# Patient Record
Sex: FEMALE | Race: WHITE | URBAN - METROPOLITAN AREA
[De-identification: names, ages, dates, MRNs, and addresses within clinical notes are randomized per-mention and may not be internally consistent; named-entity substitution may affect disease eponyms.]

---

## 2020-12-30 ENCOUNTER — OFFICE VISIT (OUTPATIENT)
Dept: ORTHOPEDIC SURGERY | Age: 50
End: 2020-12-30
Payer: COMMERCIAL

## 2020-12-30 VITALS — HEIGHT: 62 IN | BODY MASS INDEX: 26.68 KG/M2 | WEIGHT: 145 LBS

## 2020-12-30 PROBLEM — S92.491A OTHER FRACTURE OF RIGHT GREAT TOE, INITIAL ENCOUNTER FOR CLOSED FRACTURE: Status: ACTIVE | Noted: 2020-12-30

## 2020-12-30 PROCEDURE — 99203 OFFICE O/P NEW LOW 30 MIN: CPT | Performed by: ORTHOPAEDIC SURGERY

## 2020-12-30 PROCEDURE — 28490 TREAT BIG TOE FRACTURE: CPT | Performed by: ORTHOPAEDIC SURGERY

## 2020-12-30 NOTE — LETTER
[address] [place] [address]  Carlos Moser [address] [place] [address]  Phone: [phone]  Fax: [phone]    Rylie tOto MD        December 30, 2020     Patient: Teresa Soliz   YOB: 1970   Date of Visit: 12/30/2020       To Whom It May Concern: It is my medical opinion that Teresa Soliz may return to work on 12/30/20 with the following restrictions: avoid excessive walking or standing with ability to wear Post-Op Shoe for 2wks and a increase frequency of rest breaks. If you have any questions or concerns, please don't hesitate to call.     Sincerely,          Rylie Otto MD

## 2020-12-30 NOTE — PROGRESS NOTES
Chief Complaint    New Patient (Bilateral Foot P+ resulting from fall at work resulting in hyperflexion at mid foot. C/o P+, tenderness, instability over dorsal midfoot)      History of Present Illness:  Merrill Shoemaker is a 45 y.o. female for evaluation of chief complaint bilateral foot pain after injury to both feet at work. This started approximately December 1 when the injury happened. Symptoms are worse with activity and better with rest. Patient endorses bilateral foot pain but denies numbness or tingling. Treatment to date includes: Splint immobilization of right foot as well as postop shoe, protected weightbearing. She was told she had fractures across her forefoot on the right side. She is having a little bit of lateral foot pain in the left side that she thinks may just be from compensating. Medical History:  Patient's medications, allergies, past medical, surgical, social and family histories were reviewed and updated as appropriate. Review of Systems:  Complete review of systems reviewed and available in the patient's chart. They are negative or noncontributory except as per HPI. Vital Signs: There were no vitals filed for this visit. General: well-developed, well-nourished; adequately groomed  CV: RR  RESP: Respirations unlabored on RA  Skin: No rashes or ulcerations appreciated  Psych: appropriate mood and affect      Musculoskeletal:    Extremity: Bilateral lowers    Inspection: Right lower extremity there are some swelling mostly centered around the right great toe, left foot is normal    Palpation: On the right side tenderness palpation about the IP joint of the right great toe, mild tenderness to palpation to the midfoot and distal foot for lesser toes. Mild tenderness palpation over the base of the fifth on the left side.     Range of Motion: Full ankle range of motion    Stability: Ankles are stable Strength: 5 out of 5 plantar flexion dorsiflexion inversion eversion bilaterally    Special Tests: Silfverskiold is negative, pronation adduction test negative. Gait: Mildly antalgic    Pulse/perfusion: 2+ dorsalis pedis pulse, foot warm and well perfused    Neurological:    Sensation: Sensation intact to light touch throughout     Reflexes: Deep tendon reflexes intact     Functional: the patient has good coordination and balance     Radiology:     X-rays obtained and reviewed in office:  Views AP lateral oblique weightbearing of bilateral feet, AP lateral oblique weightbearing of left ankle  Impression there is a comminuted but nondisplaced and extra-articular appearing fracture of the proximal phalanx of the right great toe. On the lateral left foot radiograph there is identified a calcified mass just above the ankle joint to formal ankle films were obtained. These showed a calcified lesion with well-circumscribed borders that appears to be about the posterior syndesmosis. There is also slight deformity about the distal fibula that is consistent with potential prior injury although the patient cannot be sure of any such history. Assessment : 26-year-old female with healing fracture of proximal phalanx of right great toe. Also incidentally identified on radiographs today was able calcified and circumscribed mapped with 1 a geographic borders that may be posttraumatic synostosis versus other bony lesion. Impression:  Encounter Diagnoses   Name Primary?     Left foot pain Yes    Right foot pain     Left ankle pain, unspecified chronicity     fracture of right great toe, initial encounter for closed fracture        Office Procedures:  Orders Placed This Encounter   Procedures    XR FOOT LEFT (MIN 3 VIEWS)     Standing Status:   Future     Number of Occurrences:   1     Standing Expiration Date:   12/30/2021    XR FOOT RIGHT (MIN 3 VIEWS)     Standing Status:   Future     Number of Occurrences:   1 Standing Expiration Date:   12/30/2021       Treatment Plan:      I had a nice discussion with the patient today. With regards to her right toe fracture this will certainly heal up on its own. She can wean out of her postoperative shoe as needed over the next couple weeks and then can go into regular shoes. I did not anticipate any problems from this. With regards to the calcific mass about her left leg I would like to surveilled this. I would like to see her back in 3 months with repeat x-rays for comparison. It has geographic borders and certainly seems benign but nonetheless we will follow this appropriately and if there are significant changes or if she is having problems with that then we will refer her appropriately.